# Patient Record
Sex: FEMALE | Race: BLACK OR AFRICAN AMERICAN | Employment: FULL TIME | URBAN - METROPOLITAN AREA
[De-identification: names, ages, dates, MRNs, and addresses within clinical notes are randomized per-mention and may not be internally consistent; named-entity substitution may affect disease eponyms.]

---

## 2017-06-20 ENCOUNTER — OFFICE VISIT (OUTPATIENT)
Dept: FAMILY MEDICINE | Facility: CLINIC | Age: 32
End: 2017-06-20

## 2017-06-20 ENCOUNTER — TRANSFERRED RECORDS (OUTPATIENT)
Dept: FAMILY MEDICINE | Facility: CLINIC | Age: 32
End: 2017-06-20

## 2017-06-20 VITALS
BODY MASS INDEX: 29.59 KG/M2 | HEART RATE: 105 BPM | SYSTOLIC BLOOD PRESSURE: 151 MMHG | WEIGHT: 177.6 LBS | OXYGEN SATURATION: 97 % | DIASTOLIC BLOOD PRESSURE: 90 MMHG | TEMPERATURE: 97.8 F | HEIGHT: 65 IN

## 2017-06-20 DIAGNOSIS — R00.0 TACHYCARDIA: ICD-10-CM

## 2017-06-20 DIAGNOSIS — Z00.8 ENCOUNTER FOR WORK CAPABILITY ASSESSMENT: Primary | ICD-10-CM

## 2017-06-20 DIAGNOSIS — Z13.9 SCREENING FOR CONDITION: ICD-10-CM

## 2017-06-20 DIAGNOSIS — I10 ESSENTIAL HYPERTENSION: ICD-10-CM

## 2017-06-20 DIAGNOSIS — Z86.39 HISTORY OF HYPERTHYROIDISM: ICD-10-CM

## 2017-06-20 DIAGNOSIS — E05.90 HYPERTHYROIDISM: ICD-10-CM

## 2017-06-20 DIAGNOSIS — E04.9 THYROID ENLARGEMENT: ICD-10-CM

## 2017-06-20 LAB
BUN SERPL-MCNC: 10 MG/DL (ref 5–24)
CALCIUM SERPL-MCNC: 10 MG/DL (ref 8.5–10.4)
CHLORIDE SERPLBLD-SCNC: 100 MMOL/L (ref 94–109)
CO2 SERPL-SCNC: 24 MMOL/L (ref 20–32)
CREAT SERPL-MCNC: 0.8 MG/DL (ref 0.6–1.3)
EGFR CALCULATED (BLACK REFERENCE): >90 ML/MIN
EGFR CALCULATED (NON BLACK REFERENCE): 87.8 ML/MIN
GLUCOSE SERPL-MCNC: 88 MG/DL (ref 60–109)
HBA1C MFR BLD: 5.3 % (ref 4.1–5.7)
HEMOGLOBIN: 12.8 G/DL (ref 11.7–15.7)
POTASSIUM SERPL-SCNC: 4.1 MMOL/L (ref 3.4–5.3)
SODIUM SERPL-SCNC: 136 MMOL/L (ref 133–144)
T3, TOTAL - QUEST: 349 NG/DL (ref 45–175)
T4 FREE SERPL-MCNC: 2.3 NG/DL (ref 0.7–1.8)
TSH SERPL DL<=0.05 MIU/L-ACNC: <0.01 UIU/ML (ref 0.3–5)

## 2017-06-20 RX ORDER — ATENOLOL 25 MG/1
25 TABLET ORAL DAILY
Qty: 30 TABLET | Refills: 1 | Status: SHIPPED | OUTPATIENT
Start: 2017-06-20 | End: 2017-06-20

## 2017-06-20 RX ORDER — ATENOLOL 25 MG/1
25 TABLET ORAL DAILY
Qty: 90 TABLET | Refills: 1 | Status: SHIPPED | OUTPATIENT
Start: 2017-06-20 | End: 2017-08-24 | Stop reason: ALTCHOICE

## 2017-06-20 NOTE — MR AVS SNAPSHOT
After Visit Summary   6/20/2017    Janusz Velazquez    MRN: [id number]           Patient Information     Date Of Birth          1985        Visit Information        Provider Department      6/20/2017 9:20 AM Maggy Hu APRN CNP [facility]        Today's Diagnoses     Screening for condition    -  1    History of hyperthyroidism        Tachycardia        Hypertension due to endocrine disorder          Care Instructions      Understanding Tachycardia    The heart has an electrical system that sends signals to control the heartbeat. Any abnormal change in the speed or pattern of the heartbeat is called an arrhythmia. If you have an arrhythmia that causes the heart to beat faster than normal, this is known as tachycardia. There are many types of tachycardia. They can affect the heart s upper chambers (atria), the heart s lower chambers (ventricles), or both.  What causes tachycardia?  Many things can cause tachycardia, including:    Damage to heart tissue from heart disease, past heart attack, or heart surgery    Abnormal electrical pathways in the heart    Problems with the heart s structure that you are born with     High blood pressure    Overactive thyroid    Use of certain medicines    Severe blood loss or anemia    Dehydration    Severe stress, fear, or anxiety    Smoking    Too much alcohol or caffeine    Abuse of certain street drugs, such as cocaine    Infections    Certain inflammatory conditions    Chronic  pain syndromes  What are the symptoms of tachycardia?  Tachycardia can cause a fast, pounding, or irregular heartbeat. It can also make it harder for the heart to pump blood efficiently to the body. This may cause symptoms such as:    Shortness of breath    Tiredness    Dizziness or fainting    Chest pain  Some people with tachycardia may have no symptoms at all.  How is tachycardiatreated?  Treatment for tachycardia depends on the cause. It also depends on the  type you have and how severe your symptoms are. Tachycardia in the ventricles is often more serious than in the atria. For this reason, it may need to be treated right away. Possible treatments include:    Treating the underlying cause. For instance, if a medicine is causing your tachycardia, changing the dosage or stopping the medicine with your doctor s guidance may correct the problem.    Lifestyle changes. These include getting enough sleep and reducing stress. They also include avoiding caffeine, alcohol, tobacco, and street drugs.    Vagal maneuvers.These are techniques that may help interrupt a fast heartbeat and slow it down. One example is to take a deep breath and bear down hard while holding your breath.     Medicines. These may be used to help slow down a fast heartbeat. They may also be used to regulate the pattern of the heartbeat.    Electrical cardioversion. Special pads or paddles are used to send one or more brief  electrical shocks to the heart. This can help restore the heartbeat to normal.    Ablation. A long thin tube called a catheter is inserted into a blood vessel and threaded to the heart. The catheter sends out hot or cold energy to the areas causing abnormal signals. This destroys the problem tissue or cells. This may stop certain types of tachycardia.    Pacemaker. This is a device that is placed just under the skin in the chest. It sends paced signals to make the heart beat at a more normal rate and rhythm. You may need this when certain medicines that treat tachycardia also result in a slow heart rate.      Implantable cardioverter defibrillator (ICD). This is a device that is placed just under the skin in the chest or armpit. The ICD monitors your heart rate. When needed, it sends controlled burst of signals to the heart to overdrive a tachycardia.  It can also send a single stronger shock to the heart to stop a life-threatening type of tachycardia, if needed.    Surgery. During  surgery, different techniques may be used to create scar tissue in the areas of the heart causing abnormal signals. This may help stop certain types of tachycardia.  What are the complications of tachycardia?  These can include:    Blood clots or stroke    Heart failure. With this problem, the heart muscle is so weak it no longer pumps blood well.    Sudden cardiac arrest. This is when the heart suddenly stops beating.  When should I call my healthcare provider?  Call your healthcare provider right away if you have any of these:    Symptoms that don t get better with treatment, or get worse    New symptoms   Date Last Reviewed: 5/1/2016 2000-2017 [employer]. [address] [place] [address]. All rights reserved. This information is not intended as a substitute for professional medical care. Always follow your healthcare professional's instructions.        Discharge Instructions: Eating a Low-Salt Diet  Your health care provider has prescribed a low-salt diet for you. Most people with heart problems need to eat less salt, which is full of sodium. Too much sodium is linked to high blood pressure, which is linked to a greater risk of heart disease, stroke, blindness, and kidney problems.  Home care    Learn ways to cut back on salt (sodium):    Eat less frozen, canned, dried, packaged, and fast foods. These often contain high amounts of sodium.    Season foods with herbs instead of salt when you cook.    Season with flavorings such as pepper, lemon, garlic, and onion.    Don t add salt to your food at the table.    Sprinkle salt-free herbal blends on meats and vegetables.  Learn to read food labels carefully:    Look for the total amount of sodium per serving.    Look for foods labeled low sodium, reduced sodium, or no added salt.    Beware. Salt goes by many names. Cut down on foods with these words (all forms of salt) listed as ingredients:    Salt    Sodium    Soy sauce    Baking  soda    Baking powder    MSG    Monosodium    Na (the chemical symbol for sodium)  Other ideas:    Use more fresh food. Buy more fruits and vegetables.    Select lean meats, fish, and poultry.    Find a cookbook with low-salt recipes. You ll find ideas for tasty meals that are healthy for your heart.      When eating out, ask questions about the menu. Tell the  you're on a low-salt diet.    If you order fish, chicken, beef, or pork, ask the  to have it broiled, baked, poached, or grilled without salt, butter, or breading.    Choose plain steamed rice, boiled noodles, and baked or boiled potatoes. Top potatoes with chives and a little sour cream instead of butter.    Avoid antacids that are high in salt. Check the label before you buy.  Follow-up  Make a follow-up appointment with a nutritionist as directed by our staff.  Date Last Reviewed: 6/20/2015 2000-2017 [employer]. [address] [place] [address]. All rights reserved. This information is not intended as a substitute for professional medical care. Always follow your healthcare professional's instructions.                Follow-ups after your visit        Who to contact     Please call your clinic at [phone] to:    Ask questions about your health    Make or cancel appointments    Discuss your medicines    Learn about your test results    Speak to your doctor   If you have compliments or concerns about an experience at your clinic, or if you wish to file a complaint, please contact [facility] Patient Relations at [phone] or email us at [email or web address]         Additional Information About Your Visit        Care EveryWhere ID     This is your Care EveryWhere ID. This could be used by other organizations to access your [facility] medical records  [id number]        Your Vitals Were     Pulse Temperature Height Pulse Oximetry BMI (Body Mass Index)       105 97.8  F (36.6  C)  "(Oral) 5' 4.5\" (163.8 cm) 97% 30.01 kg/m2        Blood Pressure from Last 3 Encounters:   06/20/17 151/90    Weight from Last 3 Encounters:   06/20/17 177 lb 9.6 oz (80.6 kg)              We Performed the Following     Basic Metabolic Panel ([facility])  - Results < 1 hr     Hemoglobin (HGB) ([facility])     Hemoglobin A1c ([facility])     T3 Total ([facility])     T4  Free ([facility])     TSH  Sensitive ([facility])          Today's Medication Changes          These changes are accurate as of: 6/20/17 10:59 AM.  If you have any questions, ask your nurse or doctor.               Start taking these medicines.        Dose/Directions    atenolol 25 MG tablet   Commonly known as:  TENORMIN   Used for:  Tachycardia, Hypertension due to endocrine disorder   Started by:  Maggy Hu APRN CNP        Dose:  25 mg   Take 1 tablet (25 mg) by mouth daily   Quantity:  30 tablet   Refills:  1            Where to get your medicines      These medications were sent to [id number] - [place], [place] - [address] AT [facility] & [address], [place] [address]     Phone:  [phone]     atenolol 25 MG tablet                Primary Care Provider Office Phone # Fax #    YODIT Chavez [facility] [phone]       [facility] [address] [facility] [address]        Thank you!     Thank you for choosing [facility]  for your care. Our goal is always to provide you with excellent care. Hearing back from our patients is one way we can continue to improve our services. Please take a few minutes to complete the written survey that you may receive in the mail after your visit with us. Thank you!             Your Updated Medication List - Protect others around you: Learn how to safely use, store and throw away your medicines at www.disposemymeds.org.          This list is accurate as of: 6/20/17 10:59 AM.  Always use your most recent med " list.                   Brand Name Dispense Instructions for use    atenolol 25 MG tablet    TENORMIN    30 tablet    Take 1 tablet (25 mg) by mouth daily

## 2017-06-20 NOTE — PROGRESS NOTES
SUBJECTIVE:                                                    Janusz Velazquez is a 31 year old female who presents to clinic today for the following health issues:  Patient had a Pre-employment physical exam, where an elevated heart rate was assessed. Prospective employer now requests an exam visit with a PCP to assess tachycardia, and consider diagnosis and treatment. They have provided patient with a form that requests permission to do physical testing to   Demonstrate physical ability to perform rigorous duties required of the .    New Patient/Transfer of Care   Reports PMH of Hypothyroidism   Seen endocrinology, then Cardiology 1 year ago 6/2016 while living in [place]  Had been on a beta blocker for a time, but stopped this without noticing   a fast pulse, palpitations, or other symptoms.  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  Additional history: as documented  There is no problem list on file for this patient.    No past surgical history on file.    Social History   Substance Use Topics     Smoking status: Never Smoker     Smokeless tobacco: Not on file     Alcohol use No     Family History   Problem Relation Age of Onset     Hypertension Mother      Asthma No family hx of      DIABETES No family hx of      Coronary Artery Disease No family hx of          Allergies   Allergen Reactions     Citrus Hives  "      ROS:  Constitutional, HEENT, cardiovascular, pulmonary, gi and gu systems are negative, except as otherwise noted.    OBJECTIVE:                                                    BP (!) 145/92  Pulse 111  Temp 97.8  F (36.6  C) (Oral)  Ht 5' 4.5\" (163.8 cm)  Wt 177 lb 9.6 oz (80.6 kg)  SpO2 97%  BMI 30.01 kg/m2  Body mass index is 30.01 kg/(m^2).   GENERAL: healthy, alert and no distress  NECK: no adenopathy, no asymmetry, or scars and thyroid enlarged uniformly.  RESP: lungs clear to auscultation - no rales, rhonchi or wheezes  CV: regular rhythm, elevated rate of 110, normal S1 S2, no S3 or S4, no murmur, click or rub, no peripheral edema and peripheral pulses strong  MS: no gross musculoskeletal defects noted, no edema  Neuro: Nystagmus + bilaterally.Speech clear, gait w/smooth and coordinated movements  Diagnostic Test Results:  Results for orders placed or performed in visit on 06/20/17   Hemoglobin (HGB) (P )   Result Value Ref Range    Hemoglobin 12.8 11.7 - 15.7 g/dL   Basic Metabolic Panel (P )  - Results < 1 hr   Result Value Ref Range    Glucose 88.0 60.0 - 109.0 mg/dL    Urea Nitrogen 10.0 5.0 - 24.0 mg/dL    Creatinine 0.8 0.6 - 1.3 mg/dL    Sodium 136.0 133.0 - 144.0 mmol/L    Potassium 4.1 3.4 - 5.3 mmol/L    Chloride 100.0 94.0 - 109.0 mmol/L    Carbon Dioxide 24.0 20.0 - 32.0 mmol/L    Calcium 10.0 8.5 - 10.4 mg/dL    eGFR Calculated (Non Black Reference) 87.8 >60.0 mL/min    eGFR Calculated (Black Reference) >90 >60.0 mL/min   Hemoglobin A1c ([facility])   Result Value Ref Range    Hemoglobin A1C 5.3 4.1 - 5.7 %   T4  Free ([facility])   Result Value Ref Range    T4 Free 2.3 (H) 0.7 - 1.8 ng/dL    Narrative    Test performed by:  [facility] [address] [place] [address]   T3 Total ([facility])   Result Value Ref Range    T3 Total 349 (H) 45 - 175 ng/dL    Narrative    Test performed by:  [facility] [address] [place] [address]   TSH  " "Sensitive ([facility])   Result Value Ref Range    TSH <0.01 (L) 0.30 - 5.00 uIU/mL    Narrative    Test performed by:  [facility]  [address] [place] [address]        ASSESSMENT/PLAN:                                                    Z00.8 Encounter for work capability assessment(primary encounter diagnosis)  Comment: Sent by employer after Pre-employment physical found tachycardia rate 104-106. 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  Forms completed after lab testing that employee can work without restrictions without further testing at this time.However, unless provider allows testing by specifying testing restrictions on the form, employer will not move forward with   orientation and on-boarding activities.  (R00.0) Tachycardia    Comment: Pulse 101-111, regular rhythm, asymptomatic.Pre-employment PE assessed elevated rate and requested CPE with PCP to assess, and consider treatment.Patient later verified she had been on a betablocker for a time and is not sure of the name of the medication however, tolerated it well.  Plan: atenolol (TENORMIN) 25 MG tablet        1 po daily     (Z86.39) History of hyperthyroidism  Comment: Enlarged thyroid and tachycardia, Seen by Endocrinologist in [place] June 2016.No thyroid replacement but was told she has borderline thyroid deficiency.Seen by Cardiology and was started on a beta blocker to treat tachycardia.  Plan: T4  Free ([facility]), T3 Total ([facility]),         TSH  Sensitive ([facility]),           (I10) Essential hypertension  Comment: Uncertain how long patient has had blood pressure elevation. Asymptomatic at this time.Was on a beta blocker when living in [place] and \"did fine with that.\"  Plan: see above, begin atenolol 25mg po daily.    (E.04.9) Thyroid enlargement(E.04.9)  Has had condition without recent change for past year or more.  Does not recall having a " scan of thyroid or other diagnostics beyond lab tests and ECG with Cardiology.    (Z13.9) Screening for condition  Comment: Remainder of lab remarkable only for low TSH, elevated T3 and free T4.  Plan: Hemoglobin (HGB) (P ), Basic Metabolic         Panel ([facility])  - Results < 1 hr, Hemoglobin         A1c ([facility]), CANCELED: Thyroid [place]reys         ([facility])          Discussed FU with Dr.Jennifer Tidwell and decided to begin beta blocker and  Test TSH, T3 and free T4, and FU after results.  As patient asymptomatic, and eager to work, will allow for testing procedures by prospective employer.      As indicated by lab work.    45 minutes was spent on this visit, >50% counseling and coordination of care re:tachycardia, hypertension, hx hypothyroidism.    YODIT Galvin CNP [facility]    LATE ENTRY:Given thyroid studies conferred w/Dr.William Mares and will order a RAIU (Thyroid scan)needed to determine cause. Called [facility] to notify, all questions answered. Referral entered, patient expects to be contac demetra to set up the scan.Further FU to be determined.L R-P.

## 2017-06-20 NOTE — LETTER
RETURN TO WORK/SCHOOL FORM    6/20/2017    Re: Janusz Velazquez  1985      To Whom It May Concern:     Janusz Velazquez was seen in clinic today. She may return to work without restrictions on 6/21/17.          Restrictions:  None.       YODIT Galvin CNP  6/20/2017 9:58 AM

## 2017-06-20 NOTE — PATIENT INSTRUCTIONS
Understanding Tachycardia    The heart has an electrical system that sends signals to control the heartbeat. Any abnormal change in the speed or pattern of the heartbeat is called an arrhythmia. If you have an arrhythmia that causes the heart to beat faster than normal, this is known as tachycardia. There are many types of tachycardia. They can affect the heart s upper chambers (atria), the heart s lower chambers (ventricles), or both.  What causes tachycardia?  Many things can cause tachycardia, including:    Damage to heart tissue from heart disease, past heart attack, or heart surgery    Abnormal electrical pathways in the heart    Problems with the heart s structure that you are born with     High blood pressure    Overactive thyroid    Use of certain medicines    Severe blood loss or anemia    Dehydration    Severe stress, fear, or anxiety    Smoking    Too much alcohol or caffeine    Abuse of certain street drugs, such as cocaine    Infections    Certain inflammatory conditions    Chronic  pain syndromes  What are the symptoms of tachycardia?  Tachycardia can cause a fast, pounding, or irregular heartbeat. It can also make it harder for the heart to pump blood efficiently to the body. This may cause symptoms such as:    Shortness of breath    Tiredness    Dizziness or fainting    Chest pain  Some people with tachycardia may have no symptoms at all.  How is tachycardiatreated?  Treatment for tachycardia depends on the cause. It also depends on the type you have and how severe your symptoms are. Tachycardia in the ventricles is often more serious than in the atria. For this reason, it may need to be treated right away. Possible treatments include:    Treating the underlying cause. For instance, if a medicine is causing your tachycardia, changing the dosage or stopping the medicine with your doctor s guidance may correct the problem.    Lifestyle changes. These include getting enough sleep and reducing stress.  They also include avoiding caffeine, alcohol, tobacco, and street drugs.    Vagal maneuvers.These are techniques that may help interrupt a fast heartbeat and slow it down. One example is to take a deep breath and bear down hard while holding your breath.     Medicines. These may be used to help slow down a fast heartbeat. They may also be used to regulate the pattern of the heartbeat.    Electrical cardioversion. Special pads or paddles are used to send one or more brief  electrical shocks to the heart. This can help restore the heartbeat to normal.    Ablation. A long thin tube called a catheter is inserted into a blood vessel and threaded to the heart. The catheter sends out hot or cold energy to the areas causing abnormal signals. This destroys the problem tissue or cells. This may stop certain types of tachycardia.    Pacemaker. This is a device that is placed just under the skin in the chest. It sends paced signals to make the heart beat at a more normal rate and rhythm. You may need this when certain medicines that treat tachycardia also result in a slow heart rate.      Implantable cardioverter defibrillator (ICD). This is a device that is placed just under the skin in the chest or armpit. The ICD monitors your heart rate. When needed, it sends controlled burst of signals to the heart to overdrive a tachycardia.  It can also send a single stronger shock to the heart to stop a life-threatening type of tachycardia, if needed.    Surgery. During surgery, different techniques may be used to create scar tissue in the areas of the heart causing abnormal signals. This may help stop certain types of tachycardia.  What are the complications of tachycardia?  These can include:    Blood clots or stroke    Heart failure. With this problem, the heart muscle is so weak it no longer pumps blood well.    Sudden cardiac arrest. This is when the heart suddenly stops beating.  When should I call my healthcare provider?  Call  your healthcare provider right away if you have any of these:    Symptoms that don t get better with treatment, or get worse    New symptoms   Date Last Reviewed: 5/1/2016 2000-2017 [employer]. [address] [place] [address], [place] [address]. All rights reserved. This information is not intended as a substitute for professional medical care. Always follow your healthcare professional's instructions.        Discharge Instructions: Eating a Low-Salt Diet  Your health care provider has prescribed a low-salt diet for you. Most people with heart problems need to eat less salt, which is full of sodium. Too much sodium is linked to high blood pressure, which is linked to a greater risk of heart disease, stroke, blindness, and kidney problems.  Home care    Learn ways to cut back on salt (sodium):    Eat less frozen, canned, dried, packaged, and fast foods. These often contain high amounts of sodium.    Season foods with herbs instead of salt when you cook.    Season with flavorings such as pepper, lemon, garlic, and onion.    Don t add salt to your food at the table.    Sprinkle salt-free herbal blends on meats and vegetables.  Learn to read food labels carefully:    Look for the total amount of sodium per serving.    Look for foods labeled low sodium, reduced sodium, or no added salt.    Beware. Salt goes by many names. Cut down on foods with these words (all forms of salt) listed as ingredients:    Salt    Sodium    Soy sauce    Baking soda    Baking powder    MSG    Monosodium    Na (the chemical symbol for sodium)  Other ideas:    Use more fresh food. Buy more fruits and vegetables.    Select lean meats, fish, and poultry.    Find a cookbook with low-salt recipes. You ll find ideas for tasty meals that are healthy for your heart.      When eating out, ask questions about the menu. Tell the  you're on a low-salt diet.    If you order fish, chicken, beef, or pork, ask the  to have it broiled,  baked, poached, or grilled without salt, butter, or breading.    Choose plain steamed rice, boiled noodles, and baked or boiled potatoes. Top potatoes with chives and a little sour cream instead of butter.    Avoid antacids that are high in salt. Check the label before you buy.  Follow-up  Make a follow-up appointment with a nutritionist as directed by our staff.  Date Last Reviewed: 6/20/2015 2000-2017 [employer]. [address] [place] [address]. All rights reserved. This information is not intended as a substitute for professional medical care. Always follow your healthcare professional's instructions.      Referral for NM Thyroid Uptake and Scan faxed to [facility].  Have attempted to contact pt several times with no return call; will mail out referral with information to schedule appointment at her convenience.  MARCELO

## 2017-06-20 NOTE — NURSING NOTE
PAULO--obtained from pt in [place], [place] ([facility] [address], [place] [address]) placed in medical records to be faxed.  Pap--pt states had one done within 3 years from [place], [place]. Will wait for records to update file.

## 2017-06-23 ENCOUNTER — RECORDS - HEALTHEAST (OUTPATIENT)
Dept: ADMINISTRATIVE | Facility: OTHER | Age: 32
End: 2017-06-23

## 2017-08-15 DIAGNOSIS — R00.0 TACHYCARDIA: ICD-10-CM

## 2017-08-24 ENCOUNTER — TELEPHONE (OUTPATIENT)
Dept: FAMILY MEDICINE | Facility: CLINIC | Age: 32
End: 2017-08-24

## 2017-08-24 DIAGNOSIS — R00.0 TACHYCARDIA: Primary | ICD-10-CM

## 2017-08-24 DIAGNOSIS — E05.90 HYPERTHYROIDISM: ICD-10-CM

## 2017-08-24 DIAGNOSIS — I10 HTN (HYPERTENSION): ICD-10-CM

## 2017-08-24 RX ORDER — ATENOLOL 25 MG/1
25 TABLET ORAL DAILY
Qty: 90 TABLET | OUTPATIENT
Start: 2017-08-24

## 2017-08-24 RX ORDER — METOPROLOL SUCCINATE 25 MG/1
25 TABLET, EXTENDED RELEASE ORAL DAILY
Qty: 30 TABLET | Refills: 0 | Status: SHIPPED | OUTPATIENT
Start: 2017-08-24 | End: 2017-09-23

## 2017-08-24 NOTE — TELEPHONE ENCOUNTER
Will order metoprolol succinate 25 mg po daily to start as atenolol not available for refill. Asking pharmacy to contact patient and ask her to schedule with her clinic at her earliest convenience.

## 2017-08-24 NOTE — TELEPHONE ENCOUNTER
"Notify patient of change from atenolol 50 mg daily to metoprolol succinate 25 mg daily due to unavailability of atenolol. Unable to reach at primary number [phone] message stated   \"phone is not available at this time.\" LM asking pharmacy to notify pt of switch in therapy, and to ask her to make a FU appt with her clinic.   Will send letter to patient, as we have not received the results from her thyroid scan ordered in June 201, and it is not clear the study was completed..  "

## 2017-08-24 NOTE — LETTER
August 24, 2017      Janusz Velazquez  [address]  [address]  [place] [address]        Dear ,(Janusz)  We are writing to inform you of your test results.    Back in June the results of the thyroid tests are as follows:     TSH <0.01( lower than normal)  T3   349 (normal is no higher than 175)  T4  2.3  (normal is 0.7-1.8)      Test results indicate you may require additional follow up, see comment below.  We had discussed a thyroid scan back in July.    In addition we have to change your medicine to metoprolol succinate 25 mg daily because atenolol is not available, due to a nationwide shortage.     Please make an appointment with your provider to have your blood pressure and pulse rechecked, and to  review or follow up on your test results.  Appointments can be made by calling [phone].      If you have any questions or concerns, please call the clinic at the number listed above.       Sincerely,        YODIT Galvin CNP

## 2018-01-21 ENCOUNTER — HEALTH MAINTENANCE LETTER (OUTPATIENT)
Age: 33
End: 2018-01-21

## 2019-01-17 ENCOUNTER — OFFICE VISIT (OUTPATIENT)
Dept: FAMILY MEDICINE | Facility: CLINIC | Age: 34
End: 2019-01-17
Payer: MEDICARE

## 2019-01-17 VITALS
TEMPERATURE: 98.4 F | WEIGHT: 214 LBS | HEART RATE: 64 BPM | OXYGEN SATURATION: 99 % | SYSTOLIC BLOOD PRESSURE: 126 MMHG | BODY MASS INDEX: 36.17 KG/M2 | RESPIRATION RATE: 16 BRPM | DIASTOLIC BLOOD PRESSURE: 83 MMHG

## 2019-01-17 DIAGNOSIS — B30.9 VIRAL CONJUNCTIVITIS OF LEFT EYE: Primary | ICD-10-CM

## 2019-01-17 NOTE — PROGRESS NOTES
Preceptor Attestation:   Patient seen, evaluated and discussed with the resident. I have verified the content of the note, which accurately reflects my assessment of the patient and the plan of care.  Supervising Physician:Rick Mares MD  [facility]

## 2019-01-17 NOTE — PROGRESS NOTES
HPI:       Janusz Velazquez is a 33 year old  female without a significant past medical history who presents for the following concern:    Redness of left eye -   Patient reports 3 day history of redness and pain in her left eye.  Eye is not itchy or dry. Reports light makes it worse. 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 She has not tried any eye drops. Reports increased watering and maybe increased crusting around eye in the morning. Patient denies runny nose, congestion, fever, or cough. She wears glasses and has history of nystagmus. Does not wear contacts. No vision changes. No foreign body sensation           PMHX:     Patient Active Problem List   Diagnosis     H/O albinism     Tachycardia     Essential hypertension     Thyroid enlargement       Current Outpatient Medications   Medication Sig Dispense Refill     metoprolol (TOPROL-XL) 25 MG 24 hr tablet Take 1 tablet (25 mg) by mouth daily 30 tablet 0       Social History     Tobacco Use     Smoking status: Never Smoker   Substance Use Topics     Alcohol use: No     Drug use: No          Allergies   Allergen Reactions     Citrus Hives     Oranges            Review of Systems:     10 point review of systems negative except for noted in HPI             Physical Exam:     Vitals:    01/17/19 1629   BP: 126/83   Pulse: 64   Resp: 16   Temp: 98.4  F (36.9  C)   TempSrc: Oral   SpO2: 99%   Weight: 97.1 kg (214 lb)     Body mass index is 36.17 kg/m .    Exam:  Constitutional: healthy, alert and no distress  HEENT: Normocephalic. Atraumatic. PERRL. EOM intact bilaterally. Mild conjunctival erythema of left eye medially. No discharge or crusting. No rhinitis, no cervical adenopathy   Cardiovascular: Regular rate and rhythm. No murmurs, clicks gallops or rub  Respiratory: Lungs clear to auscultation. No wheezing or crackles present   Psychiatric: mentation appears normal and affect  normal/bright      Assessment and Plan     1. Viral conjunctivitis of left eye  Mild pain in left eye. Exam findings consistent with viral conjunctivitis. No trauma, vision changes or foreign body sensation. Discussed that antibiotic eye drops are not indicated with a viral infection, typical course and symptom control. Also discussed indications to return to clinic. Patient understanding of plan and in agreement.     Options for treatment and follow-up care were reviewed with the patient and/or guardian. Janusz Velazquez and/or guardian engaged in the decision making process and verbalized understanding of the options discussed and agreed with the final plan.    Wilda Pelletier DO (PGY2)   Pager #[phone]    Precepted today with: Rick Mares MD

## 2019-09-15 ENCOUNTER — HOSPITAL (OUTPATIENT)
Dept: OTHER | Age: 34
End: 2019-09-15

## 2019-09-15 PROCEDURE — 99283 EMERGENCY DEPT VISIT LOW MDM: CPT | Performed by: PHYSICIAN ASSISTANT
